# Patient Record
Sex: FEMALE | Race: BLACK OR AFRICAN AMERICAN | Employment: UNEMPLOYED | URBAN - METROPOLITAN AREA
[De-identification: names, ages, dates, MRNs, and addresses within clinical notes are randomized per-mention and may not be internally consistent; named-entity substitution may affect disease eponyms.]

---

## 2019-10-29 ENCOUNTER — TRANSFERRED RECORDS (OUTPATIENT)
Dept: HEALTH INFORMATION MANAGEMENT | Facility: CLINIC | Age: 44
End: 2019-10-29

## 2019-10-30 ENCOUNTER — MEDICAL CORRESPONDENCE (OUTPATIENT)
Dept: HEALTH INFORMATION MANAGEMENT | Facility: CLINIC | Age: 44
End: 2019-10-30

## 2019-10-30 ENCOUNTER — TELEPHONE (OUTPATIENT)
Dept: MATERNAL FETAL MEDICINE | Facility: CLINIC | Age: 44
End: 2019-10-30

## 2019-10-30 ENCOUNTER — TRANSCRIBE ORDERS (OUTPATIENT)
Dept: MATERNAL FETAL MEDICINE | Facility: CLINIC | Age: 44
End: 2019-10-30

## 2019-10-30 DIAGNOSIS — O26.90 PREGNANCY RELATED CONDITION, ANTEPARTUM: Primary | ICD-10-CM

## 2019-10-31 ENCOUNTER — PRE VISIT (OUTPATIENT)
Dept: MATERNAL FETAL MEDICINE | Facility: CLINIC | Age: 44
End: 2019-10-31

## 2019-11-04 ENCOUNTER — OFFICE VISIT (OUTPATIENT)
Dept: MATERNAL FETAL MEDICINE | Facility: CLINIC | Age: 44
End: 2019-11-04
Attending: OBSTETRICS & GYNECOLOGY
Payer: COMMERCIAL

## 2019-11-04 ENCOUNTER — HOSPITAL ENCOUNTER (OUTPATIENT)
Dept: ULTRASOUND IMAGING | Facility: CLINIC | Age: 44
Discharge: HOME | End: 2019-11-04
Attending: OBSTETRICS & GYNECOLOGY | Admitting: OBSTETRICS & GYNECOLOGY
Payer: COMMERCIAL

## 2019-11-04 DIAGNOSIS — O28.9 ABNORMAL FINDINGS ON ANTENATAL SCREENING: Primary | ICD-10-CM

## 2019-11-04 DIAGNOSIS — O26.90 PREGNANCY RELATED CONDITION, ANTEPARTUM: ICD-10-CM

## 2019-11-04 DIAGNOSIS — O09.522 MULTIGRAVIDA OF ADVANCED MATERNAL AGE IN SECOND TRIMESTER: ICD-10-CM

## 2019-11-04 PROCEDURE — 76811 OB US DETAILED SNGL FETUS: CPT

## 2019-11-04 NOTE — PROGRESS NOTES
Please see full imaging report from [facility] program under imaging tab.      Nell Mishra MD  Maternal Fetal Medicine

## 2019-11-26 ENCOUNTER — HOSPITAL ENCOUNTER (OUTPATIENT)
Dept: ULTRASOUND IMAGING | Facility: CLINIC | Age: 44
Discharge: HOME | End: 2019-11-26
Attending: OBSTETRICS & GYNECOLOGY | Admitting: OBSTETRICS & GYNECOLOGY
Payer: COMMERCIAL

## 2019-11-26 ENCOUNTER — OFFICE VISIT (OUTPATIENT)
Dept: MATERNAL FETAL MEDICINE | Facility: CLINIC | Age: 44
End: 2019-11-26
Attending: OBSTETRICS & GYNECOLOGY
Payer: COMMERCIAL

## 2019-11-26 DIAGNOSIS — O28.9 ABNORMAL FINDINGS ON ANTENATAL SCREENING: ICD-10-CM

## 2019-11-26 DIAGNOSIS — O09.522 MULTIGRAVIDA OF ADVANCED MATERNAL AGE IN SECOND TRIMESTER: ICD-10-CM

## 2019-11-26 DIAGNOSIS — O28.9 ABNORMAL FINDINGS ON ANTENATAL SCREENING: Primary | ICD-10-CM

## 2019-11-26 PROCEDURE — 76816 OB US FOLLOW-UP PER FETUS: CPT

## 2019-11-26 NOTE — PROGRESS NOTES
Please see full imaging report from [facility] program under imaging tab.    Findings reviewed with Najma and her  today - they are well known to me from their prior visit. Unfortunately they arrived over 20 minutes late today and we were unable to do both the fetal echo and the follow up US (due to overbooked clinic schedule) and so we opted for the overall fetal assessment rather than the echo. I did recommend an echo be done at the [facility] pediatric cardiology clinic given the NIPT and the advancing gestational age but they decline at this time. No cardiac abnormalities have been identified on US. They are aware that even in the absence of US findings, they fetus is still at an increased risk for Trisomy 21/Down syndrome. They accept this risk.     At this time, I recommend serial growth US every four weeks, and weekly BPPs to start at 32 weeks. They live in [place] and the distance to get here is challenging with 6 kids at home. They accept a follow up US with me here in 8 weeks, but would like to have the next US in a month with Dr. Borrego in [place]. They also plan to have the weekly BPPs in [place] and deliver there, unless other concerning findings are noted on US. I would recommend notifying pediatrics locally of this plan, given the potential for a  with trisomy 21, but a normal anatomic assessment thus far other than a short nasal bone.     Nell Mishra MD  Maternal Fetal Medicine

## 2020-01-27 ENCOUNTER — TRANSFERRED RECORDS (OUTPATIENT)
Dept: HEALTH INFORMATION MANAGEMENT | Facility: CLINIC | Age: 45
End: 2020-01-27

## 2020-02-21 ENCOUNTER — OFFICE VISIT (OUTPATIENT)
Dept: MATERNAL FETAL MEDICINE | Facility: CLINIC | Age: 45
End: 2020-02-21
Attending: OBSTETRICS & GYNECOLOGY
Payer: COMMERCIAL

## 2020-02-21 ENCOUNTER — HOSPITAL ENCOUNTER (OUTPATIENT)
Dept: ULTRASOUND IMAGING | Facility: CLINIC | Age: 45
Discharge: HOME | End: 2020-02-21
Attending: OBSTETRICS & GYNECOLOGY | Admitting: OBSTETRICS & GYNECOLOGY
Payer: COMMERCIAL

## 2020-02-21 DIAGNOSIS — O09.523 MULTIGRAVIDA OF ADVANCED MATERNAL AGE IN THIRD TRIMESTER: ICD-10-CM

## 2020-02-21 DIAGNOSIS — O35.10X0 SUSPECTED CHROMOSOME ANOMALY OF FETUS AFFECTING MANAGEMENT OF MOTHER IN SINGLETON PREGNANCY, ANTEPARTUM: Primary | ICD-10-CM

## 2020-02-21 DIAGNOSIS — O28.9 ABNORMAL FINDINGS ON ANTENATAL SCREENING: ICD-10-CM

## 2020-02-21 PROCEDURE — 59025 FETAL NON-STRESS TEST: CPT | Mod: ZF | Performed by: OBSTETRICS & GYNECOLOGY

## 2020-02-21 PROCEDURE — 76816 OB US FOLLOW-UP PER FETUS: CPT

## 2020-02-21 PROCEDURE — 76818 FETAL BIOPHYS PROFILE W/NST: CPT | Performed by: OBSTETRICS & GYNECOLOGY

## 2020-02-21 NOTE — NURSING NOTE
NST performed due to failed BPP 6/8 (no breathing movements seen).  Dr. Bñauelos reviewed EFM tracing. See NST/BPP Doc Flowsheet tab.  Okay to discharge home with follow up planned with [facility].  Dr. Bañuelos communicated via phone with Dr. Cary regarding US findings today.  Patient given education material regarding fetal kick counts.  Patient understands plan of care moving forward.

## 2020-03-25 NOTE — PROGRESS NOTES
"Please see \"Imaging\" tab under \"Chart Review\" for details of today's US at the [facility].    Russell Bañuelos MD  Maternal-Fetal Medicine    "
You can access the FollowMyHealth Patient Portal offered by [facility] by registering at the following website: [email or web address]. By joining [facility]’s FollowMyHealth portal, you will also be able to view your health information using other applications (apps) compatible with our system.